# Patient Record
Sex: FEMALE | Race: WHITE | NOT HISPANIC OR LATINO | Employment: STUDENT | URBAN - METROPOLITAN AREA
[De-identification: names, ages, dates, MRNs, and addresses within clinical notes are randomized per-mention and may not be internally consistent; named-entity substitution may affect disease eponyms.]

---
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
## 2020-10-21 ENCOUNTER — OFFICE VISIT (OUTPATIENT)
Dept: URGENT CARE | Facility: CLINIC | Age: 20
End: 2020-10-21
Payer: COMMERCIAL

## 2020-10-21 ENCOUNTER — TELEPHONE (OUTPATIENT)
Dept: OBSTETRICS AND GYNECOLOGY | Facility: CLINIC | Age: 20
End: 2020-10-21

## 2020-10-21 VITALS — OXYGEN SATURATION: 99 % | TEMPERATURE: 98 F | HEART RATE: 117 BPM

## 2020-10-21 DIAGNOSIS — N94.10 PAIN IN FEMALE GENITALIA ON INTERCOURSE: ICD-10-CM

## 2020-10-21 DIAGNOSIS — N89.8 VAGINAL IRRITATION: Primary | ICD-10-CM

## 2020-10-21 PROCEDURE — 99214 PR OFFICE/OUTPT VISIT, EST, LEVL IV, 30-39 MIN: ICD-10-PCS | Mod: S$GLB,,, | Performed by: NURSE PRACTITIONER

## 2020-10-21 PROCEDURE — 99214 OFFICE O/P EST MOD 30 MIN: CPT | Mod: S$GLB,,, | Performed by: NURSE PRACTITIONER

## 2020-10-21 RX ORDER — FLUCONAZOLE 150 MG/1
150 TABLET ORAL DAILY
Qty: 1 TABLET | Refills: 0 | Status: SHIPPED | OUTPATIENT
Start: 2020-10-21 | End: 2020-10-22

## 2020-10-21 RX ORDER — NAPROXEN 500 MG/1
500 TABLET ORAL 2 TIMES DAILY WITH MEALS
Qty: 10 TABLET | Refills: 0 | Status: SHIPPED | OUTPATIENT
Start: 2020-10-21 | End: 2020-10-26

## 2020-10-21 NOTE — PROGRESS NOTES
Subjective:       Patient ID: Brandee Busch is a 20 y.o. female.    Vitals:  temperature is 98.3 °F (36.8 °C). Her pulse is 117 (abnormal). Her oxygen saturation is 99%.     Chief Complaint: Vaginal Pain    Patient presents with c.o vaginal pain. 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 Patient states she would like the area checked out. Patient reports having sexual intercourse three days ago.     Vaginal Pain  The patient's pertinent negatives include no missed menses, pelvic pain or vaginal discharge. This is a new problem. Episode onset: 3 days. The problem occurs constantly. The problem has been unchanged. She is not pregnant. Pertinent negatives include no abdominal pain, back pain, chills, dysuria, fever, frequency, hematuria, nausea, rash, urgency or vomiting. There is no history of ovarian cysts.       Constitution: Negative for chills and fever.   Neck: Negative for painful lymph nodes.   Gastrointestinal: Negative for abdominal pain, nausea and vomiting.   Genitourinary: Positive for vaginal pain. Negative for dysuria, frequency, urgency, urine decreased, hematuria, history of kidney stones, painful menstruation, irregular menstruation, missed menses, heavy menstrual bleeding, ovarian cysts, genital trauma, vaginal discharge, vaginal bleeding, vaginal odor, painful intercourse, genital sore, painful ejaculation and pelvic pain.   Musculoskeletal: Negative for back pain.   Skin: Negative for rash and lesion.   Hematologic/Lymphatic: Negative for swollen lymph nodes.       Objective:      Physical Exam   Constitutional: She is oriented to person, place, and time. She appears well-developed.  Non-toxic appearance. She does not appear ill. No distress. normal  HENT:   Head: Normocephalic and atraumatic.   Ears:   Right Ear: External ear normal.   Left Ear: External ear normal.   Nose: No nasal deformity. No epistaxis.   Mouth/Throat: Oropharynx is clear and moist and mucous membranes are normal.    Eyes: Pupils are equal, round, and reactive to light. Conjunctivae and lids are normal. extraocular movement intact  Neck: Trachea normal, normal range of motion and phonation normal. Neck supple.   Cardiovascular: S2 normal, normal heart sounds and normal pulses. Exam reveals no decreased pulses.   Pulmonary/Chest: Effort normal and breath sounds normal. No accessory muscle usage. No respiratory distress. She has no decreased breath sounds. She has no wheezes. She has no rhonchi. She has no rales.   Abdominal: Soft. Normal appearance and bowel sounds are normal. She exhibits no distension. There is no abdominal tenderness. flat abdomen  Genitourinary:       There is tenderness on the right labia. There is no rash, lesion or injury on the right labia. There is tenderness on the left labia. There is no rash, lesion or injury on the left labia.   Neurological: She is alert and oriented to person, place, and time.   Skin: Skin is warm, dry, intact and not diaphoretic. Psychiatric: Her speech is normal and behavior is normal.   Nursing note and vitals reviewed.        Assessment:       1. Vaginal irritation    2. Pain in female genitalia on intercourse        Plan:         Vaginal irritation  -     Ambulatory referral/consult to Gynecology    Pain in female genitalia on intercourse  -     Ambulatory referral/consult to Gynecology  -     naproxen (NAPROSYN) 500 MG tablet; Take 1 tablet (500 mg total) by mouth 2 (two) times daily with meals. for 5 days  Dispense: 10 tablet; Refill: 0    Other orders  -     fluconazole (DIFLUCAN) 150 MG Tab; Take 1 tablet (150 mg total) by mouth once daily. for 1 day  Dispense: 1 tablet; Refill: 0      Patient Instructions     General Referral to [facility]   You were referred to [facility]  for follow-up care on your condition.    Please call [phone] to schedule your appointment.    Please go to the Emergency Department for any concerns or worsening of  condition.  Please follow up with your primary care doctor or specialist in the next 48-72hrs as needed.    If you  smoke, please stop smoking.    Discharge Instructions  Cool compresses to the affected area at least 3 times a day  Warm sitz baths are also helpful.  Naprosyn was prescribed for pain relief, take as directed  Avoiding sexual intercourse until symptoms have resolved.    As discussed he use condoms when having sexual intercourse.  Follow-up with the GYN Department as advise for further evaluation and management of your current symptoms.  For any worsening of symptoms please follow-up in the emergency room.    Vaginal Infection: Understanding the Vaginal Environment  The vagina is a canal. It connects the uterus (womb) to the outside of the body. It is home to many types of bacteria and other tiny organisms. These different bacteria most often stay balanced in number. This keeps the vagina healthy. If the balance changes, it can cause infection.   A healthy environment  Many types of bacteria are present in a healthy vagina. When balanced, they dont cause problems. Small amounts of yeast may also be present without causing problems. The most common type of bacteria in the vagina is lactobacillus. It helps keep the vagina at a low pH. A low pH keeps bad bacteria from taking over.  Normal vaginal discharge  The vagina makes fluid. It is sent out as discharge. This also keeps the vagina healthy. Normal discharge can be clear, white, or yellowish. Most women find that normal discharge varies in amount and color through the month.  An unhealthy environment  The vaginal environment may get out of balance. This may result in a vaginal infection. There are a few reasons this can happen. The pH may have changed. The amount of one organism, such as yeast, may increase. Or an outside organism may get into the vagina and throw off the balance:  · Bacterial vaginosis (BV). BV is due to an imbalance in the normal  bacteria in the vagina. Lactobacillus bacteria decrease. As a result, the numbers of bad bacteria increase.  · Candidiasis (yeast infection). Yeast is a type of fungus. A yeast infection occurs when yeast cells in the vagina increase. They then attack vaginal tissues. A type of yeast called Candida albicans is often involved.  · Trichomoniasis (trich). Trich is a parasite. It is passed from one person to another during sex. Men with trich often dont have any symptoms. In women, it can take weeks or months before symptoms appear.  Date Last Reviewed: 3/1/2017  © 6552-5992 [employer]. [address] [place] [address]. All rights reserved. This information is not intended as a substitute for professional medical care. Always follow your healthcare professional's instructions.

## 2020-10-21 NOTE — PATIENT INSTRUCTIONS
General Referral to [facility]   You were referred to [facility]  for follow-up care on your condition.    Please call [phone] to schedule your appointment.    Please go to the Emergency Department for any concerns or worsening of condition.  Please follow up with your primary care doctor or specialist in the next 48-72hrs as needed.    If you  smoke, please stop smoking.    Discharge Instructions  Cool compresses to the affected area at least 3 times a day  Warm sitz baths are also helpful.  Naprosyn was prescribed for pain relief, take as directed  Avoiding sexual intercourse until symptoms have resolved.    As discussed he use condoms when having sexual intercourse.  Follow-up with the GYN Department as advise for further evaluation and management of your current symptoms.  For any worsening of symptoms please follow-up in the emergency room.    Vaginal Infection: Understanding the Vaginal Environment  The vagina is a canal. It connects the uterus (womb) to the outside of the body. It is home to many types of bacteria and other tiny organisms. These different bacteria most often stay balanced in number. This keeps the vagina healthy. If the balance changes, it can cause infection.   A healthy environment  Many types of bacteria are present in a healthy vagina. When balanced, they dont cause problems. Small amounts of yeast may also be present without causing problems. The most common type of bacteria in the vagina is lactobacillus. It helps keep the vagina at a low pH. A low pH keeps bad bacteria from taking over.  Normal vaginal discharge  The vagina makes fluid. It is sent out as discharge. This also keeps the vagina healthy. Normal discharge can be clear, white, or yellowish. Most women find that normal discharge varies in amount and color through the month.  An unhealthy environment  The vaginal environment may get out of balance. This may result in a vaginal infection. There are a few  reasons this can happen. The pH may have changed. The amount of one organism, such as yeast, may increase. Or an outside organism may get into the vagina and throw off the balance:  · Bacterial vaginosis (BV). BV is due to an imbalance in the normal bacteria in the vagina. Lactobacillus bacteria decrease. As a result, the numbers of bad bacteria increase.  · Candidiasis (yeast infection). Yeast is a type of fungus. A yeast infection occurs when yeast cells in the vagina increase. They then attack vaginal tissues. A type of yeast called Candida albicans is often involved.  · Trichomoniasis (trich). Trich is a parasite. It is passed from one person to another during sex. Men with trich often dont have any symptoms. In women, it can take weeks or months before symptoms appear.  Date Last Reviewed: 3/1/2017  © 6346-0893 [employer]. [address] [place] [address]. All rights reserved. This information is not intended as a substitute for professional medical care. Always follow your healthcare professional's instructions.

## 2020-10-22 ENCOUNTER — OFFICE VISIT (OUTPATIENT)
Dept: OBSTETRICS AND GYNECOLOGY | Facility: CLINIC | Age: 20
End: 2020-10-22
Payer: COMMERCIAL

## 2020-10-22 VITALS — WEIGHT: 116.88 LBS | HEIGHT: 64 IN | BODY MASS INDEX: 19.96 KG/M2

## 2020-10-22 DIAGNOSIS — N94.9 GENITAL LESION, FEMALE: Primary | ICD-10-CM

## 2020-10-22 PROCEDURE — 99203 PR OFFICE/OUTPT VISIT, NEW, LEVL III, 30-44 MIN: ICD-10-PCS | Mod: S$GLB,,, | Performed by: NURSE PRACTITIONER

## 2020-10-22 PROCEDURE — 3008F BODY MASS INDEX DOCD: CPT | Mod: CPTII,S$GLB,, | Performed by: NURSE PRACTITIONER

## 2020-10-22 PROCEDURE — 99999 PR PBB SHADOW E&M-EST. PATIENT-LVL II: CPT | Mod: PBBFAC,,, | Performed by: NURSE PRACTITIONER

## 2020-10-22 PROCEDURE — 99999 PR PBB SHADOW E&M-EST. PATIENT-LVL II: ICD-10-PCS | Mod: PBBFAC,,, | Performed by: NURSE PRACTITIONER

## 2020-10-22 PROCEDURE — 3008F PR BODY MASS INDEX (BMI) DOCUMENTED: ICD-10-PCS | Mod: CPTII,S$GLB,, | Performed by: NURSE PRACTITIONER

## 2020-10-22 PROCEDURE — 87529 HSV DNA AMP PROBE: CPT

## 2020-10-22 PROCEDURE — 99203 OFFICE O/P NEW LOW 30 MIN: CPT | Mod: S$GLB,,, | Performed by: NURSE PRACTITIONER

## 2020-10-22 RX ORDER — VALACYCLOVIR HYDROCHLORIDE 1 G/1
1000 TABLET, FILM COATED ORAL EVERY 12 HOURS
Qty: 20 TABLET | Refills: 0 | Status: SHIPPED | OUTPATIENT
Start: 2020-10-22 | End: 2020-10-29 | Stop reason: SDUPTHER

## 2020-10-22 NOTE — PROGRESS NOTES
CC:  Vaginal lesions    HPI: Pt is a 20 y.o.  female who presents c/o sores to her vagina- onset 3 days ago.  Reports she first had some burning with urination then noticed the vaginal sores.  Reports associated pain, tingling and itching.  Pt declines STD testing. Initially experienced similar symptoms during the summer, reports +HSV1 titer.    ROS:  GENERAL: Feeling well overall. Denies fever or chills.   SKIN: Denies rash or lesions.   HEAD: Denies head injury or headache.   NODES: Denies enlarged lymph nodes.   CHEST: Denies chest pain or shortness of breath.   CARDIOVASCULAR: Denies palpitations or left sided chest pain.   ABDOMEN: No abdominal pain, constipation, diarrhea, nausea, vomiting or rectal bleeding.   URINARY: No dysuria, hematuria, or burning on urination.  REPRODUCTIVE: See HPI.   BREASTS: Denies pain, lumps, or nipple discharge.   HEMATOLOGIC: No easy bruisability or excessive bleeding.   MUSCULOSKELETAL: Denies joint pain or swelling.   NEUROLOGIC: Denies syncope or weakness.   PSYCHIATRIC: Denies depression, anxiety or mood swings.    PE:   APPEARANCE: Well nourished, well developed, female, in no acute distress.  VULVA: + multiple regions of ulceration at inferior vaginal opening and clitoral driscoll. Normal external female genitalia.  URETHRAL MEATUS: Normal size and location, no lesions, no prolapse.  URETHRA: No masses, tenderness, or prolapse.  VAGINA: Moist. No lesions or lacerations noted. No abnormal discharge present. No odor present.   CERVIX: No lesions or discharge. No cervical motion tenderness.   UTERUS: Normal size, regular shape, mobile, non-tender.  ADNEXA: No tenderness. No fullness or masses palpated in the adnexal regions.   ANUS PERINEUM: Normal.      Diagnosis:  1. Vaginal lesion            Plan:     Orders Placed This Encounter            Herpes Simplex Virus 1&2 PCR Non-Blood Genital    valacyclovir (VALTREX) 1000 MG tablet    acyclovir 5% (ZOVIRAX) 5 % ointment      HSV culture  Valtrex   OTC 2% Lidocaine Gel prn.    Discussed lesions are highly suspicious for genital herpes  Discussed that clinical recurrences of genital HSV are common, but are typically less severe than primary infections.   Discussed that recurrences are also more common in immunosuppressed patients    Discussed triggers for recurrence -- Illness, stress, sunlight, and fatigue can trigger recurrent herpes outbreaks. menstrual periods may trigger an outbreak.  Discussed recommendations during pregnancy including starting daily suppressive therapy at 36 weeks gestation and need for speculum exam prior to labor to assess for active lesions.  Discussed if have active lesions at the time of delivery a  section is indicated.     The nature of herpes genitalis is fully explained. It is an incurable recurrent disease, which is generally benign, and treatable with medications such as Zovirax, Famvir or Valtrex. It is rare, but not impossible, to transmit the virus while in an asymptomatic stage; but highly likely to transmit during acute symptoms; thus avoidance of sex during symptomatic phases is best. The use of a condom between spouses during asymptomatic phases is a personal but not mandatory choice. The patient indicates understanding of these issues. The current recommendation is to use medication at the time of outbreak for the relief of symptoms unless she has frequent outbreaks or is in a relationship with an unaffected partner.  In those scenarios she should use daily suppression to decrease recurrence and to decrease the risk of transmission.    Follow-up for annual exam or PRN.      RYAN Jeter

## 2020-10-22 NOTE — LETTER
October 22, 2020      Kvng Marina NP  [address] [facility] [address]           [facility] Michela Esparza [facility] [address]  [address], [facility] [address]  [facility] [address]  Phone: [phone]  Fax: [phone]          Patient: Brandee Busch   MR Number: [id number]   YOB: 2000   Date of Visit: 10/22/2020       Dear Kvng Marina:    Thank you for referring Brandee Busch to me for evaluation. Attached you will find relevant portions of my assessment and plan of care.    If you have questions, please do not hesitate to call me. I look forward to following Brandee Busch along with you.    Sincerely,    Adia Giron, HERMELINDA    Enclosure  CC:  No Recipients    If you would like to receive this communication electronically, please contact [email or web address] or [phone] to request more information on [employer] Link access.    For providers and/or their staff who would like to refer a patient to [facility], please contact us through our one-stop-shop provider referral line, [facility], at [phone].    If you feel you have received this communication in error or would no longer like to receive these types of communications, please e-mail [email or web address]

## 2020-10-27 ENCOUNTER — TELEPHONE (OUTPATIENT)
Dept: OBSTETRICS AND GYNECOLOGY | Facility: CLINIC | Age: 20
End: 2020-10-27

## 2020-10-27 NOTE — TELEPHONE ENCOUNTER
----- Message from Adrianna Enamorado sent at 10/23/2020 10:08 AM CDT -----  Regarding: Medical Advice  Contact: Patient  Patient is requesting a call back regarding follow up questions she has from appt attended on 10/22/2020    Patient can be reached at [phone]

## 2020-10-28 LAB
HSV1 DNA SPEC QL NAA+PROBE: POSITIVE
HSV2 DNA SPEC QL NAA+PROBE: NEGATIVE
SPECIMEN SOURCE: ABNORMAL

## 2020-10-29 ENCOUNTER — TELEPHONE (OUTPATIENT)
Dept: OBSTETRICS AND GYNECOLOGY | Facility: CLINIC | Age: 20
End: 2020-10-29

## 2020-10-29 DIAGNOSIS — N94.9 GENITAL LESION, FEMALE: ICD-10-CM

## 2020-10-29 DIAGNOSIS — A60.00 GENITAL HERPES SIMPLEX, UNSPECIFIED SITE: Primary | ICD-10-CM

## 2020-10-29 RX ORDER — VALACYCLOVIR HYDROCHLORIDE 1 G/1
1000 TABLET, FILM COATED ORAL DAILY
Qty: 7 TABLET | Refills: 4 | Status: SHIPPED | OUTPATIENT
Start: 2020-10-29 | End: 2020-11-24

## 2020-10-29 NOTE — TELEPHONE ENCOUNTER
Contact made with patient, HSV1+ PCR swab findings discussed in detail. All questions answered. Valtrex x 4 refill sent to pharmacy. FU with NP as needed.

## 2020-11-24 ENCOUNTER — TELEPHONE (OUTPATIENT)
Dept: OBSTETRICS AND GYNECOLOGY | Facility: CLINIC | Age: 20
End: 2020-11-24

## 2020-11-24 DIAGNOSIS — A60.00 GENITAL HERPES SIMPLEX, UNSPECIFIED SITE: ICD-10-CM

## 2020-11-24 DIAGNOSIS — B00.9 HSV-2 (HERPES SIMPLEX VIRUS 2) INFECTION: ICD-10-CM

## 2020-11-24 DIAGNOSIS — A60.00 GENITAL HERPES SIMPLEX, UNSPECIFIED SITE: Primary | ICD-10-CM

## 2020-11-24 RX ORDER — VALACYCLOVIR HYDROCHLORIDE 1 G/1
1000 TABLET, FILM COATED ORAL DAILY
Qty: 30 TABLET | Refills: 2 | Status: SHIPPED | OUTPATIENT
Start: 2020-11-24 | End: 2020-12-01

## 2020-11-24 RX ORDER — VALACYCLOVIR HYDROCHLORIDE 1 G/1
TABLET, FILM COATED ORAL
Qty: 20 TABLET | OUTPATIENT
Start: 2020-11-24

## 2020-11-24 NOTE — TELEPHONE ENCOUNTER
----- Message from Adia Giron NP sent at 11/24/2020  9:39 AM CST -----  Regarding: Valtrex  Please call pt and notify that refill of Valtrex sent to pharmacy. One tablet daily for 7 days with 2 refill.    Thanks,  Adia

## 2020-11-24 NOTE — TELEPHONE ENCOUNTER
----- Message from Adia Giron NP sent at 11/24/2020  9:52 AM CST -----  Please call [facility] pharmacy and notify that dosing has been changed to 1000 mg once daily x 7 days. 30 tablets with 2 refill.    Thanks,  Adia

## 2020-11-25 ENCOUNTER — TELEPHONE (OUTPATIENT)
Dept: OBSTETRICS AND GYNECOLOGY | Facility: CLINIC | Age: 20
End: 2020-11-25

## 2020-11-25 DIAGNOSIS — A60.00 GENITAL HERPES SIMPLEX, UNSPECIFIED SITE: ICD-10-CM

## 2020-12-04 ENCOUNTER — TELEPHONE (OUTPATIENT)
Dept: OBSTETRICS AND GYNECOLOGY | Facility: CLINIC | Age: 20
End: 2020-12-04

## 2020-12-04 NOTE — TELEPHONE ENCOUNTER
Contact made with patient. 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 All questions answered.